# Patient Record
Sex: MALE | Race: WHITE | NOT HISPANIC OR LATINO | Employment: FULL TIME | URBAN - METROPOLITAN AREA
[De-identification: names, ages, dates, MRNs, and addresses within clinical notes are randomized per-mention and may not be internally consistent; named-entity substitution may affect disease eponyms.]

---

## 2017-07-14 ENCOUNTER — HOSPITAL ENCOUNTER (OUTPATIENT)
Dept: LAB | Facility: MEDICAL CENTER | Age: 32
End: 2017-07-14
Attending: INTERNAL MEDICINE
Payer: COMMERCIAL

## 2017-07-14 LAB
25(OH)D3 SERPL-MCNC: 32 NG/ML (ref 30–100)
ALBUMIN SERPL BCP-MCNC: 4.7 G/DL (ref 3.2–4.9)
ALP SERPL-CCNC: 42 U/L (ref 30–99)
ALT SERPL-CCNC: 15 U/L (ref 2–50)
AMYLASE SERPL-CCNC: 39 U/L (ref 20–103)
AST SERPL-CCNC: 16 U/L (ref 12–45)
BASOPHILS # BLD AUTO: 0.7 % (ref 0–1.8)
BASOPHILS # BLD: 0.04 K/UL (ref 0–0.12)
BILIRUB CONJ SERPL-MCNC: 0.2 MG/DL (ref 0.1–0.5)
BILIRUB INDIRECT SERPL-MCNC: 1.3 MG/DL (ref 0–1)
BILIRUB SERPL-MCNC: 1.5 MG/DL (ref 0.1–1.5)
EOSINOPHIL # BLD AUTO: 0.1 K/UL (ref 0–0.51)
EOSINOPHIL NFR BLD: 1.8 % (ref 0–6.9)
ERYTHROCYTE [DISTWIDTH] IN BLOOD BY AUTOMATED COUNT: 36.3 FL (ref 35.9–50)
FOLATE SERPL-MCNC: 17.7 NG/ML
HCT VFR BLD AUTO: 43.4 % (ref 42–52)
HGB BLD-MCNC: 15.5 G/DL (ref 14–18)
IMM GRANULOCYTES # BLD AUTO: 0.01 K/UL (ref 0–0.11)
IMM GRANULOCYTES NFR BLD AUTO: 0.2 % (ref 0–0.9)
LIPASE SERPL-CCNC: 11 U/L (ref 11–82)
LYMPHOCYTES # BLD AUTO: 1.81 K/UL (ref 1–4.8)
LYMPHOCYTES NFR BLD: 33.5 % (ref 22–41)
MAGNESIUM SERPL-MCNC: 2 MG/DL (ref 1.5–2.5)
MCH RBC QN AUTO: 32.1 PG (ref 27–33)
MCHC RBC AUTO-ENTMCNC: 35.7 G/DL (ref 33.7–35.3)
MCV RBC AUTO: 89.9 FL (ref 81.4–97.8)
MONOCYTES # BLD AUTO: 0.43 K/UL (ref 0–0.85)
MONOCYTES NFR BLD AUTO: 7.9 % (ref 0–13.4)
NEUTROPHILS # BLD AUTO: 3.02 K/UL (ref 1.82–7.42)
NEUTROPHILS NFR BLD: 55.9 % (ref 44–72)
NRBC # BLD AUTO: 0 K/UL
NRBC BLD AUTO-RTO: 0 /100 WBC
PLATELET # BLD AUTO: 206 K/UL (ref 164–446)
PMV BLD AUTO: 10.9 FL (ref 9–12.9)
PROT SERPL-MCNC: 6.5 G/DL (ref 6–8.2)
RBC # BLD AUTO: 4.83 M/UL (ref 4.7–6.1)
VIT B12 SERPL-MCNC: 156 PG/ML (ref 211–911)
WBC # BLD AUTO: 5.4 K/UL (ref 4.8–10.8)

## 2017-07-14 PROCEDURE — 82746 ASSAY OF FOLIC ACID SERUM: CPT

## 2017-07-14 PROCEDURE — 83690 ASSAY OF LIPASE: CPT

## 2017-07-14 PROCEDURE — 36415 COLL VENOUS BLD VENIPUNCTURE: CPT

## 2017-07-14 PROCEDURE — 82306 VITAMIN D 25 HYDROXY: CPT

## 2017-07-14 PROCEDURE — 83735 ASSAY OF MAGNESIUM: CPT

## 2017-07-14 PROCEDURE — 80076 HEPATIC FUNCTION PANEL: CPT

## 2017-07-14 PROCEDURE — 82607 VITAMIN B-12: CPT

## 2017-07-14 PROCEDURE — 82150 ASSAY OF AMYLASE: CPT

## 2017-07-14 PROCEDURE — 85025 COMPLETE CBC W/AUTO DIFF WBC: CPT

## 2018-10-22 ENCOUNTER — OFFICE VISIT (OUTPATIENT)
Dept: MEDICAL GROUP | Facility: MEDICAL CENTER | Age: 33
End: 2018-10-22
Payer: COMMERCIAL

## 2018-10-22 VITALS
WEIGHT: 174 LBS | HEIGHT: 67 IN | SYSTOLIC BLOOD PRESSURE: 124 MMHG | BODY MASS INDEX: 27.31 KG/M2 | OXYGEN SATURATION: 98 % | DIASTOLIC BLOOD PRESSURE: 62 MMHG | HEART RATE: 83 BPM | TEMPERATURE: 97.1 F

## 2018-10-22 DIAGNOSIS — F90.0 ATTENTION DEFICIT HYPERACTIVITY DISORDER (ADHD), PREDOMINANTLY INATTENTIVE TYPE: ICD-10-CM

## 2018-10-22 DIAGNOSIS — Z76.89 ENCOUNTER TO ESTABLISH CARE: ICD-10-CM

## 2018-10-22 DIAGNOSIS — K21.9 GASTROESOPHAGEAL REFLUX DISEASE, ESOPHAGITIS PRESENCE NOT SPECIFIED: ICD-10-CM

## 2018-10-22 DIAGNOSIS — E53.8 LOW SERUM VITAMIN B12: ICD-10-CM

## 2018-10-22 PROCEDURE — 99204 OFFICE O/P NEW MOD 45 MIN: CPT | Performed by: PHYSICIAN ASSISTANT

## 2018-10-22 RX ORDER — OMEPRAZOLE 20 MG/1
20 CAPSULE, DELAYED RELEASE ORAL DAILY
Qty: 90 CAP | Refills: 3 | Status: SHIPPED | OUTPATIENT
Start: 2018-10-22 | End: 2019-10-30 | Stop reason: SDUPTHER

## 2018-10-22 NOTE — ASSESSMENT & PLAN NOTE
This is a 33-year-old male who is here today to establish care. He has a chronic history of reflux symptoms. Has had symptoms for at least 10 years. He was scoped about 4 months ago by Dr. Merchant. Was told he needed a follow-up endoscopy in about 4 years. He was not diagnosed with Lyles's esophagus. Takes omeprazole 20 mg daily with good results. No exacerbation of symptoms as long as he is taking medication. Denies any current dysphagia.

## 2018-10-22 NOTE — PROGRESS NOTES
Subjective:   Greg Mistry is a 33 y.o. male here today for GERD, ADHD and to establish care.    GERD (gastroesophageal reflux disease)  This is a 33-year-old male who is here today to establish care. He has a chronic history of reflux symptoms. Has had symptoms for at least 10 years. He was scoped about 4 months ago by Dr. Merchant. Was told he needed a follow-up endoscopy in about 4 years. He was not diagnosed with Lyles's esophagus. Takes omeprazole 20 mg daily with good results. No exacerbation of symptoms as long as he is taking medication. Denies any current dysphagia.    ADHD (attention deficit hyperactivity disorder)  Years ago he was on Ritalin for ADHD. Primarily and attentiveness. 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 States that the ability to keep focus has been difficult. He denies any depression or anxiety. He is  with one child and his wife is pregnant. Would like to get back on some kind of ADHD medication. In the past he was on Ritalin twice a day.    Low serum vitamin B12  Because of his chronic history of reflux his vitamin B12 level has been low. Takes of B12 supplement. Recent labs showed good control as vitamin B12.       Current medicines (including changes today)  Current Outpatient Prescriptions   Medication Sig Dispense Refill   • omeprazole (PRILOSEC) 20 MG delayed-release capsule Take 1 Cap by mouth every day. 1/2 hour prior to same meal. 90 Cap 3   • methylphenidate (RITALIN) 10 MG TABS Take 10 mg by mouth 2 times a day.       No current facility-administered medications for this visit.      He  has a past medical history of GERD (gastroesophageal reflux disease).    Social History and Family History were reviewed and updated.    ROS   No chest pain, no shortness of breath, no abdominal pain and all other systems were reviewed and are negative.       Objective:     Blood pressure  "124/62, pulse 83, temperature 36.2 °C (97.1 °F), height 1.702 m (5' 7\"), weight 78.9 kg (174 lb), SpO2 98 %. Body mass index is 27.25 kg/m².   Physical Exam:  Constitutional: Alert, no distress.  Skin: Warm, dry, good turgor, no rashes in visible areas.  Eye: Equal, round and reactive, conjunctiva clear, lids normal.  ENMT: Lips without lesions, good dentition, oropharynx clear.  Neck: Trachea midline, no masses.   Lymph: No cervical or supraclavicular lymphadenopathy  Respiratory: Unlabored respiratory effort, lungs clear to auscultation, no wheezes, no ronchi.  Cardiovascular: Normal S1, S2, no murmur, no edema.  Abdomen: Soft, non-tender, no masses.  Psych: Alert and oriented x3, normal affect and mood.        Assessment and Plan:   The following treatment plan was discussed    1. Gastroesophageal reflux disease, esophagitis presence not specified  Chronic condition. Controlled. Provided prescription for Prilosec 20 mg take as directed. Follow with GI if needed.  - omeprazole (PRILOSEC) 20 MG delayed-release capsule; Take 1 Cap by mouth every day. 1/2 hour prior to same meal.  Dispense: 90 Cap; Refill: 3    2. Attention deficit hyperactivity disorder (ADHD), predominantly inattentive type  Chronic condition. Recent exacerbation given a new position at his work. Refer to psychiatry to establish care. In the future may return for refill of medication. Would like to first see him be established by psychiatry and have the proper diagnosis.  - REFERRAL TO PSYCHIATRY    3. Low serum vitamin B12  Chronic condition status post GERD. Stable. Continue vitamin B12 supplement over-the-counter.    4. Encounter to establish care      Followup: Return in about 1 year (around 10/22/2019), or if symptoms worsen or fail to improve.    Please note that this dictation was created using voice recognition software. I have made every reasonable attempt to correct obvious errors, but I expect that there are errors of grammar and " possibly content that I did not discover before finalizing the note.

## 2018-10-22 NOTE — ASSESSMENT & PLAN NOTE
Years ago he was on Ritalin for ADHD. Primarily and attentiveness. 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 States that the ability to keep focus has been difficult. He denies any depression or anxiety. He is  with one child and his wife is pregnant. Would like to get back on some kind of ADHD medication. In the past he was on Ritalin twice a day.

## 2018-10-22 NOTE — ASSESSMENT & PLAN NOTE
Because of his chronic history of reflux his vitamin B12 level has been low. Takes of B12 supplement. Recent labs showed good control as vitamin B12.

## 2019-01-11 ENCOUNTER — OFFICE VISIT (OUTPATIENT)
Dept: URGENT CARE | Facility: CLINIC | Age: 34
End: 2019-01-11
Payer: COMMERCIAL

## 2019-01-11 VITALS
RESPIRATION RATE: 16 BRPM | HEART RATE: 87 BPM | SYSTOLIC BLOOD PRESSURE: 112 MMHG | WEIGHT: 170 LBS | BODY MASS INDEX: 26.68 KG/M2 | DIASTOLIC BLOOD PRESSURE: 64 MMHG | TEMPERATURE: 97.2 F | HEIGHT: 67 IN | OXYGEN SATURATION: 97 %

## 2019-01-11 DIAGNOSIS — H65.02 ACUTE SEROUS OTITIS MEDIA OF LEFT EAR, RECURRENCE NOT SPECIFIED: ICD-10-CM

## 2019-01-11 PROCEDURE — 99214 OFFICE O/P EST MOD 30 MIN: CPT | Performed by: PHYSICIAN ASSISTANT

## 2019-01-11 RX ORDER — AMOXICILLIN 875 MG/1
875 TABLET, COATED ORAL 2 TIMES DAILY
Qty: 20 TAB | Refills: 0 | Status: SHIPPED | OUTPATIENT
Start: 2019-01-11 | End: 2019-01-21

## 2019-01-11 ASSESSMENT — ENCOUNTER SYMPTOMS
COUGH: 0
CHILLS: 0
MYALGIAS: 0
NECK PAIN: 0
PALPITATIONS: 0
WHEEZING: 0
SPUTUM PRODUCTION: 0
SORE THROAT: 0
SHORTNESS OF BREATH: 0
FEVER: 0
RHINORRHEA: 0
NAUSEA: 0
VOMITING: 0
DIARRHEA: 0
HEADACHES: 0

## 2019-01-11 NOTE — PROGRESS NOTES
"Subjective:      Greg Mistry is a 33 y.o. male who presents with Otalgia (x2 days, left ear pain, feels as though there could be water/clogged and jaw pain and pressure )            Otalgia    There is pain in the left ear. This is a new problem. Episode onset: 1-2 days  The problem has been gradually worsening. There has been no fever. Associated symptoms include ear discharge (clear) and hearing loss (muffled hearing left ear ). Pertinent negatives include no coughing, diarrhea, headaches, neck pain, rash, rhinorrhea, sore throat or vomiting. Associated symptoms comments: Left jaw hurts, ear congestion. He has tried NSAIDs for the symptoms. The treatment provided mild relief.       Past Medical History:   Diagnosis Date   • GERD (gastroesophageal reflux disease)        Past Surgical History:   Procedure Laterality Date   • OTHER ABDOMINAL SURGERY      appy       History reviewed. No pertinent family history.    Allergies   Allergen Reactions   • Nkda [No Known Drug Allergy]        Medications, Allergies, and current problem list reviewed today in Epic      Review of Systems   Constitutional: Negative for chills, fever and malaise/fatigue.   HENT: Positive for ear discharge (clear), ear pain and hearing loss (muffled hearing left ear ). Negative for congestion, rhinorrhea and sore throat.    Respiratory: Negative for cough, sputum production, shortness of breath and wheezing.    Cardiovascular: Negative for chest pain, palpitations and leg swelling.   Gastrointestinal: Negative for diarrhea, nausea and vomiting.   Musculoskeletal: Negative for myalgias and neck pain.   Skin: Negative for rash.   Neurological: Negative for headaches.       All other systems reviewed and are negative.        Objective:     /64   Pulse 87   Temp 36.2 °C (97.2 °F)   Resp 16   Ht 1.702 m (5' 7\")   Wt 77.1 kg (170 lb)   SpO2 97%   BMI 26.63 kg/m²      Physical Exam   Constitutional: He is oriented to person, place, and " time. He appears well-developed and well-nourished. No distress.   HENT:   Head: Normocephalic and atraumatic.   Right Ear: Tympanic membrane, external ear and ear canal normal.   Left Ear: External ear and ear canal normal. No drainage or swelling. Tympanic membrane is injected. Tympanic membrane is not perforated. A middle ear effusion is present.   Nose: Nose normal.   Mouth/Throat: Uvula is midline, oropharynx is clear and moist and mucous membranes are normal.   Eyes: Conjunctivae are normal.   Neck: Neck supple.   Cardiovascular: Normal rate, regular rhythm and normal heart sounds.  Exam reveals no gallop and no friction rub.    No murmur heard.  Pulmonary/Chest: Effort normal. No respiratory distress. He has no wheezes. He has no rales.   Lymphadenopathy:     He has no cervical adenopathy.   Neurological: He is alert and oriented to person, place, and time. No cranial nerve deficit.   Skin: Skin is warm and dry. No rash noted.   Psychiatric: He has a normal mood and affect. His behavior is normal. Judgment and thought content normal.               Assessment/Plan:     1. Acute serous otitis media of left ear, recurrence not specified  amoxicillin (AMOXIL) 875 MG tablet         Current Outpatient Prescriptions:   •  amoxicillin (AMOXIL) 875 MG tablet, Take 1 Tab by mouth 2 times a day for 10 days., Disp: 20 Tab, Rfl: 0       Differential diagnoses, Supportive care, and indications for immediate follow-up discussed with patient.   Instructed to return to clinic or nearest emergency department for any change in condition, further concerns, or worsening of symptoms.    The patient demonstrated a good understanding and agreed with the treatment plan.      Gayathri Nava P.A.-C.

## 2019-01-28 ENCOUNTER — OFFICE VISIT (OUTPATIENT)
Dept: URGENT CARE | Facility: MEDICAL CENTER | Age: 34
End: 2019-01-28
Payer: COMMERCIAL

## 2019-01-28 VITALS
WEIGHT: 173 LBS | HEART RATE: 107 BPM | SYSTOLIC BLOOD PRESSURE: 106 MMHG | RESPIRATION RATE: 16 BRPM | TEMPERATURE: 98.4 F | DIASTOLIC BLOOD PRESSURE: 64 MMHG | BODY MASS INDEX: 27.1 KG/M2 | OXYGEN SATURATION: 97 %

## 2019-01-28 DIAGNOSIS — J02.9 SORE THROAT: ICD-10-CM

## 2019-01-28 DIAGNOSIS — R05.9 COUGH: ICD-10-CM

## 2019-01-28 DIAGNOSIS — J06.9 VIRAL UPPER RESPIRATORY TRACT INFECTION: Primary | ICD-10-CM

## 2019-01-28 LAB
FLUAV+FLUBV AG SPEC QL IA: NORMAL
INT CON NEG: NEGATIVE
INT CON NEG: NEGATIVE
INT CON POS: POSITIVE
INT CON POS: POSITIVE
S PYO AG THROAT QL: NEGATIVE

## 2019-01-28 PROCEDURE — 87804 INFLUENZA ASSAY W/OPTIC: CPT | Performed by: NURSE PRACTITIONER

## 2019-01-28 PROCEDURE — 87880 STREP A ASSAY W/OPTIC: CPT | Performed by: NURSE PRACTITIONER

## 2019-01-28 PROCEDURE — 99213 OFFICE O/P EST LOW 20 MIN: CPT | Performed by: NURSE PRACTITIONER

## 2019-01-28 ASSESSMENT — ENCOUNTER SYMPTOMS
DIZZINESS: 0
EYES NEGATIVE: 1
SHORTNESS OF BREATH: 0
SENSORY CHANGE: 0
FOCAL WEAKNESS: 0
SORE THROAT: 1
CARDIOVASCULAR NEGATIVE: 1
TINGLING: 0
GASTROINTESTINAL NEGATIVE: 1
CHILLS: 1
HEADACHES: 1
WEAKNESS: 0
PSYCHIATRIC NEGATIVE: 1
COUGH: 1
MUSCULOSKELETAL NEGATIVE: 1

## 2019-01-29 NOTE — PROGRESS NOTES
Subjective:     Greg Mistry is a 33 y.o. male who presents for Pharyngitis (cough, fever, achy, 2 days)       Pharyngitis    This is a new problem. The current episode started yesterday. The problem has been gradually worsening. Maximum temperature: unmeasured. The pain is moderate. Associated symptoms include congestion, coughing and headaches. Pertinent negatives include no ear pain or shortness of breath. Associated symptoms comments: +chills. He has tried NSAIDs for the symptoms.   Pt has had his flu shot this season. Pt states he has young children at home and wants to make sure he doesn't have strep or the flu.    PMH:  has a past medical history of GERD (gastroesophageal reflux disease).    MEDS:   Current Outpatient Prescriptions:   •  omeprazole (PRILOSEC) 20 MG delayed-release capsule, Take 1 Cap by mouth every day. 1/2 hour prior to same meal., Disp: 90 Cap, Rfl: 3  •  methylphenidate (RITALIN) 10 MG TABS, Take 10 mg by mouth 2 times a day., Disp: , Rfl:     ALLERGIES:   Allergies   Allergen Reactions   • Nkda [No Known Drug Allergy]      SURGHX:   Past Surgical History:   Procedure Laterality Date   • OTHER ABDOMINAL SURGERY      appy     SOCHX:  reports that he has never smoked. He has never used smokeless tobacco. He reports that he drinks about 4.2 oz of alcohol per week . He reports that he does not use drugs.     FH: Reviewed with patient, not pertinent to this visit.     Review of Systems   Constitutional: Positive for chills. Negative for malaise/fatigue.   HENT: Positive for congestion and sore throat. Negative for ear pain.    Eyes: Negative.    Respiratory: Positive for cough. Negative for shortness of breath.    Cardiovascular: Negative.  Negative for chest pain.   Gastrointestinal: Negative.    Genitourinary: Negative.    Musculoskeletal: Negative.    Skin: Negative.    Neurological: Positive for headaches. Negative for dizziness, tingling, sensory change, focal weakness and weakness.    Psychiatric/Behavioral: Negative.    All other systems reviewed and are negative.    Objective:     /64   Pulse (!) 107   Temp 36.9 °C (98.4 °F)   Resp 16   Wt 78.5 kg (173 lb)   SpO2 97%   BMI 27.10 kg/m²     Physical Exam   Constitutional: He is oriented to person, place, and time. He appears well-developed and well-nourished. He is cooperative. No distress.   HENT:   Head: Normocephalic.   Right Ear: Tympanic membrane normal.   Left Ear: Tympanic membrane normal.   Nose: Rhinorrhea present.   Mouth/Throat: Uvula is midline and mucous membranes are normal. Posterior oropharyngeal edema and posterior oropharyngeal erythema present.   Eyes: Pupils are equal, round, and reactive to light. Conjunctivae and EOM are normal.   Neck: Normal range of motion.   Cardiovascular: Regular rhythm, normal heart sounds and normal pulses.    Pulmonary/Chest: Effort normal and breath sounds normal. No respiratory distress.   Abdominal: Soft. Bowel sounds are normal.   Musculoskeletal: Normal range of motion. He exhibits no deformity.   Lymphadenopathy:     He has no cervical adenopathy.   Neurological: He is alert and oriented to person, place, and time. He has normal strength. No sensory deficit.   Skin: Skin is warm and dry. Capillary refill takes less than 2 seconds.   Psychiatric: He has a normal mood and affect.   Vitals reviewed.    Rapid Strep A swab: negative    Influenza A/B swab: negative       Assessment/Plan:     1. Viral upper respiratory tract infection    2. Sore throat  - POCT Rapid Strep A    3. Cough  - POCT Influenza A/B    Discussed likely viral etiology and expected course and duration of illness. Discussed supportive measures including increasing fluids and rest as well as OTC symptom management including acetaminophen and/or ibuprofen PRN pain and/or fever.    Patient advised to: Return for 1) Symptoms don't improve or worsen, or go to ER, 2) Follow up with primary care in 7-10  days.    Differential diagnosis, natural history, supportive care, and indications for immediate follow-up discussed. All questions answered. Patient agrees with the plan of care.

## 2019-01-29 NOTE — PATIENT INSTRUCTIONS
"Upper Respiratory Infection, Adult  Most upper respiratory infections (URIs) are a viral infection of the air passages leading to the lungs. A URI affects the nose, throat, and upper air passages. The most common type of URI is nasopharyngitis and is typically referred to as \"the common cold.\"  URIs run their course and usually go away on their own. Most of the time, a URI does not require medical attention, but sometimes a bacterial infection in the upper airways can follow a viral infection. This is called a secondary infection. Sinus and middle ear infections are common types of secondary upper respiratory infections.  Bacterial pneumonia can also complicate a URI. A URI can worsen asthma and chronic obstructive pulmonary disease (COPD). Sometimes, these complications can require emergency medical care and may be life threatening.  What are the causes?  Almost all URIs are caused by viruses. A virus is a type of germ and can spread from one person to another.  What increases the risk?  You may be at risk for a URI if:  · You smoke.  · You have chronic heart or lung disease.  · You have a weakened defense (immune) system.  · You are very young or very old.  · You have nasal allergies or asthma.  · You work in crowded or poorly ventilated areas.  · You work in health care facilities or schools.  What are the signs or symptoms?  Symptoms typically develop 2-3 days after you come in contact with a cold virus. Most viral URIs last 7-10 days. However, viral URIs from the influenza virus (flu virus) can last 14-18 days and are typically more severe. Symptoms may include:  · Runny or stuffy (congested) nose.  · Sneezing.  · Cough.  · Sore throat.  · Headache.  · Fatigue.  · Fever.  · Loss of appetite.  · Pain in your forehead, behind your eyes, and over your cheekbones (sinus pain).  · Muscle aches.  How is this diagnosed?  Your health care provider may diagnose a URI by:  · Physical exam.  · Tests to check that your " symptoms are not due to another condition such as:  ¨ Strep throat.  ¨ Sinusitis.  ¨ Pneumonia.  ¨ Asthma.  How is this treated?  A URI goes away on its own with time. It cannot be cured with medicines, but medicines may be prescribed or recommended to relieve symptoms. Medicines may help:  · Reduce your fever.  · Reduce your cough.  · Relieve nasal congestion.  Follow these instructions at home:  · Take medicines only as directed by your health care provider.  · Gargle warm saltwater or take cough drops to comfort your throat as directed by your health care provider.  · Use a warm mist humidifier or inhale steam from a shower to increase air moisture. This may make it easier to breathe.  · Drink enough fluid to keep your urine clear or pale yellow.  · Eat soups and other clear broths and maintain good nutrition.  · Rest as needed.  · Return to work when your temperature has returned to normal or as your health care provider advises. You may need to stay home longer to avoid infecting others. You can also use a face mask and careful hand washing to prevent spread of the virus.  · Increase the usage of your inhaler if you have asthma.  · Do not use any tobacco products, including cigarettes, chewing tobacco, or electronic cigarettes. If you need help quitting, ask your health care provider.  How is this prevented?  The best way to protect yourself from getting a cold is to practice good hygiene.  · Avoid oral or hand contact with people with cold symptoms.  · Wash your hands often if contact occurs.  There is no clear evidence that vitamin C, vitamin E, echinacea, or exercise reduces the chance of developing a cold. However, it is always recommended to get plenty of rest, exercise, and practice good nutrition.  Contact a health care provider if:  · You are getting worse rather than better.  · Your symptoms are not controlled by medicine.  · You have chills.  · You have worsening shortness of breath.  · You have brown  or red mucus.  · You have yellow or brown nasal discharge.  · You have pain in your face, especially when you bend forward.  · You have a fever.  · You have swollen neck glands.  · You have pain while swallowing.  · You have white areas in the back of your throat.  Get help right away if:  · You have severe or persistent:  ¨ Headache.  ¨ Ear pain.  ¨ Sinus pain.  ¨ Chest pain.  · You have chronic lung disease and any of the following:  ¨ Wheezing.  ¨ Prolonged cough.  ¨ Coughing up blood.  ¨ A change in your usual mucus.  · You have a stiff neck.  · You have changes in your:  ¨ Vision.  ¨ Hearing.  ¨ Thinking.  ¨ Mood.  This information is not intended to replace advice given to you by your health care provider. Make sure you discuss any questions you have with your health care provider.  Document Released: 06/13/2002 Document Revised: 08/20/2017 Document Reviewed: 03/25/2015  [employer] Interactive Patient Education © 2017 [employer]

## 2019-03-26 ENCOUNTER — OFFICE VISIT (OUTPATIENT)
Dept: MEDICAL GROUP | Facility: MEDICAL CENTER | Age: 34
End: 2019-03-26
Payer: COMMERCIAL

## 2019-03-26 VITALS
HEIGHT: 67 IN | TEMPERATURE: 97 F | WEIGHT: 168.4 LBS | HEART RATE: 93 BPM | SYSTOLIC BLOOD PRESSURE: 104 MMHG | DIASTOLIC BLOOD PRESSURE: 64 MMHG | BODY MASS INDEX: 26.43 KG/M2 | RESPIRATION RATE: 16 BRPM | OXYGEN SATURATION: 100 %

## 2019-03-26 DIAGNOSIS — J06.9 ACUTE URI: ICD-10-CM

## 2019-03-26 DIAGNOSIS — F90.0 ATTENTION DEFICIT HYPERACTIVITY DISORDER (ADHD), PREDOMINANTLY INATTENTIVE TYPE: ICD-10-CM

## 2019-03-26 DIAGNOSIS — K21.9 GASTROESOPHAGEAL REFLUX DISEASE, ESOPHAGITIS PRESENCE NOT SPECIFIED: ICD-10-CM

## 2019-03-26 PROCEDURE — 99214 OFFICE O/P EST MOD 30 MIN: CPT | Performed by: PHYSICIAN ASSISTANT

## 2019-03-26 RX ORDER — PROMETHAZINE HYDROCHLORIDE AND CODEINE PHOSPHATE 6.25; 1 MG/5ML; MG/5ML
5 SYRUP ORAL NIGHTLY PRN
Qty: 160 ML | Refills: 0 | Status: SHIPPED | OUTPATIENT
Start: 2019-03-26 | End: 2019-04-25

## 2019-03-26 ASSESSMENT — PATIENT HEALTH QUESTIONNAIRE - PHQ9: CLINICAL INTERPRETATION OF PHQ2 SCORE: 0

## 2019-03-26 NOTE — ASSESSMENT & PLAN NOTE
This is a 33-year-old male coming today complaining of a 2-week history of coughing.  Associated sinus congestion and drainage.  Initially started as flulike symptoms.  He states that his 20-month-old girl is sick at home.  There is also a 4-month-old.  Both are in a  facility at [facility].  He has been taking over-the-counter cold medication such as Mucinex.  Denies any fever, shortness of breath or chest pain.  States that his wife wanted him to come in to be evaluated for his cough at nighttime.  Makes it difficult for her to sleep.

## 2019-03-26 NOTE — ASSESSMENT & PLAN NOTE
Chronic condition.  Stable.  Continues with omeprazole daily.  Unable to get a 30-day supply from his pharmacy.

## 2019-03-26 NOTE — ASSESSMENT & PLAN NOTE
He never followed up with psychiatry as it was a provider that I had persuaded again seen.  States that he will put the ADHD on hold.  He is overall doing well.  Would like Ritalin taken off of the medications list.

## 2019-03-26 NOTE — PROGRESS NOTES
"Subjective:   Greg Mistry is a 33 y.o. male here today for cough for 2 weeks, ADHD and GERD.    Acute URI  This is a 33-year-old male coming today complaining of a 2-week history of coughing.  Associated sinus congestion and drainage.  Initially started as flulike symptoms.  He states that his 20-month-old girl is sick at home.  There is also a 4-month-old.  Both are in a  facility at [facility].  He has been taking over-the-counter cold medication such as Mucinex.  Denies any fever, shortness of breath or chest pain.  States that his wife wanted him to come in to be evaluated for his cough at nighttime.  Makes it difficult for her to sleep.    ADHD (attention deficit hyperactivity disorder)  He never followed up with psychiatry as it was a provider that I had persuaded again seen.  States that he will put the ADHD on hold.  He is overall doing well.  Would like Ritalin taken off of the medications list.    GERD (gastroesophageal reflux disease)  Chronic condition.  Stable.  Continues with omeprazole daily.  Unable to get a 30-day supply from his pharmacy.       Current medicines (including changes today)  Current Outpatient Prescriptions   Medication Sig Dispense Refill   • promethazine-codeine (PHENERGAN-CODEINE) 6.25-10 MG/5ML Syrup Take 5 mL by mouth at bedtime as needed for up to 30 days. 160 mL 0   • omeprazole (PRILOSEC) 20 MG delayed-release capsule Take 1 Cap by mouth every day. 1/2 hour prior to same meal. 90 Cap 3     No current facility-administered medications for this visit.      He  has a past medical history of GERD (gastroesophageal reflux disease).    Social History and Family History were reviewed and updated.    ROS   No chest pain, no shortness of breath, no abdominal pain and all other systems were reviewed and are negative.       Objective:     Blood pressure 104/64, pulse 93, temperature 36.1 °C (97 °F), resp. rate 16, height 1.702 m (5' 7\"), weight 76.4 kg (168 lb 6.4 oz), SpO2 100 %. " Body mass index is 26.38 kg/m².   Physical Exam:  Constitutional: Alert, no distress.  Skin: Warm, dry, good turgor, no rashes in visible areas.  Eye: Equal, round and reactive, conjunctiva clear, lids normal.  ENMT: Lips without lesions, good dentition, oropharynx clear.  Neck: Trachea midline, no masses.   Lymph: No cervical or supraclavicular lymphadenopathy  Respiratory: Unlabored respiratory effort, lungs clear to auscultation, no wheezes, no ronchi.  Cardiovascular: Normal S1, S2, no murmur, no edema.  Psych: Alert and oriented x3, normal affect and mood.        Assessment and Plan:   The following treatment plan was discussed    1. Acute URI  Acute, new onset condition.  Discussed with viral process.  Push fluids.  Clear nose regularly.  May continue over-the-counter cold medications.  Provided a cough syrup with codeine.   reviewed.  Medication appropriate.  May take half hour prior to bedtime.  Discussed side effects.  Do not take with other cold medications.  Follow-up with any worsening symptoms such as fever, shortness of breath or chest pain.  Cough could last another few weeks.  Potentially start an over-the-counter antihistamine such as Claritin.  - promethazine-codeine (PHENERGAN-CODEINE) 6.25-10 MG/5ML Syrup; Take 5 mL by mouth at bedtime as needed for up to 30 days.  Dispense: 160 mL; Refill: 0    2. Attention deficit hyperactivity disorder (ADHD), predominantly inattentive type  Chronic condition.  Stable.  We will continue to monitor symptoms.    3. Gastroesophageal reflux disease, esophagitis presence not specified  Chronic condition.  Stable.  Continue omeprazole use daily.  Advised to contact insurance regarding mail order service.  May merit a 90-day supply      Followup: Return if symptoms worsen or fail to improve.    Please note that this dictation was created using voice recognition software. I have made every reasonable attempt to correct obvious errors, but I expect that there are  errors of grammar and possibly content that I did not discover before finalizing the note.

## 2019-10-30 DIAGNOSIS — K21.9 GASTROESOPHAGEAL REFLUX DISEASE, ESOPHAGITIS PRESENCE NOT SPECIFIED: ICD-10-CM

## 2019-10-30 RX ORDER — OMEPRAZOLE 20 MG/1
20 CAPSULE, DELAYED RELEASE ORAL DAILY
Qty: 30 CAP | Refills: 11 | Status: SHIPPED | OUTPATIENT
Start: 2019-10-30 | End: 2020-10-18

## 2019-12-31 ENCOUNTER — OFFICE VISIT (OUTPATIENT)
Dept: MEDICAL GROUP | Facility: MEDICAL CENTER | Age: 34
End: 2019-12-31
Payer: COMMERCIAL

## 2019-12-31 VITALS
WEIGHT: 167.55 LBS | OXYGEN SATURATION: 98 % | RESPIRATION RATE: 16 BRPM | BODY MASS INDEX: 26.3 KG/M2 | SYSTOLIC BLOOD PRESSURE: 122 MMHG | HEIGHT: 67 IN | DIASTOLIC BLOOD PRESSURE: 64 MMHG | HEART RATE: 84 BPM | TEMPERATURE: 97.7 F

## 2019-12-31 DIAGNOSIS — J02.9 ACUTE PHARYNGITIS, UNSPECIFIED ETIOLOGY: ICD-10-CM

## 2019-12-31 PROBLEM — J06.9 ACUTE URI: Status: RESOLVED | Noted: 2019-03-26 | Resolved: 2019-12-31

## 2019-12-31 PROCEDURE — 99214 OFFICE O/P EST MOD 30 MIN: CPT | Performed by: PHYSICIAN ASSISTANT

## 2019-12-31 RX ORDER — PREDNISONE 20 MG/1
TABLET ORAL
Qty: 18 TAB | Refills: 0 | Status: SHIPPED | OUTPATIENT
Start: 2019-12-31 | End: 2021-05-12

## 2019-12-31 RX ORDER — VALACYCLOVIR HYDROCHLORIDE 1 G/1
1000 TABLET, FILM COATED ORAL 2 TIMES DAILY
Qty: 40 TAB | Refills: 1 | Status: SHIPPED | OUTPATIENT
Start: 2019-12-31 | End: 2021-05-12

## 2019-12-31 NOTE — PROGRESS NOTES
"Subjective:   Greg Mistry is a 34 y.o. male here today for sore throat for 4 days.    Acute pharyngitis  This is a 34-year-old male who complains of a 4-day history of a sore throat.  Ibuprofen is not effective.  Believes he may have a cold.  Concerned about strep.  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  No lesions on his hands.  No concern of his feet.  Denies any fevers.  No chest pain or shortness of breath.  Does have some discomfort in the ears as well as lower throat and jaw.  Initial pain began on Saturday in the front of his mouth.             Current medicines (including changes today)  Current Outpatient Medications   Medication Sig Dispense Refill   • predniSONE (DELTASONE) 20 MG Tab Take 3 tablets day 1-3, then two tablets day 4-6 and then 1 tablet day 7-9. 18 Tab 0   • valacyclovir (VALTREX) 1 GM Tab Take 1 Tab by mouth 2 times a day. 40 Tab 1   • omeprazole (PRILOSEC) 20 MG delayed-release capsule TAKE 1 CAP BY MOUTH EVERY DAY. 1/2 HOUR PRIOR TO SAME MEAL. 30 Cap 11     No current facility-administered medications for this visit.      He  has a past medical history of GERD (gastroesophageal reflux disease).    Social History and Family History were reviewed and updated.    ROS     No chest pain, no shortness of breath, no abdominal pain and all other systems were reviewed and are negative.       Objective:     /64 (BP Location: Left arm, Patient Position: Sitting, BP Cuff Size: Adult)   Pulse 84   Temp 36.5 °C (97.7 °F) (Temporal)   Resp 16   Ht 1.702 m (5' 7\")   Wt 76 kg (167 lb 8.8 oz)   SpO2 98%  Body mass index is 26.24 kg/m².   Physical Exam:  Constitutional: Alert, no distress.  Skin: Warm, dry, good turgor, no rashes in visible areas.  Eye: Equal, round and reactive, conjunctiva clear, lids normal.  ENMT: Lips without lesions, good dentition, oropharynx with multiple ulcers noted.  Flat whitish lesions with surrounding erythema.  Couple " lesions on the sides of the tongue and one on the front of the mouth.  Neck: Trachea midline, no masses.   Lymph: No cervical or supraclavicular lymphadenopathy  Respiratory: Unlabored respiratory effort, lungs appear clear, no wheezes.  Cardiovascular: Normal S1, S2, no murmur, no edema.  Psych: Alert and oriented x3, normal affect and mood.        Assessment and Plan:   The following treatment plan was discussed    1. Acute pharyngitis, unspecified etiology  Acute, new onset condition.  Rapid stress was negative.  Differential includes herpes stomatitis, aphthous ulcers or another viral infection.  Provided a tapering course of prednisone.  Also provided Valtrex 1 g 2 tablets twice a day for up to 2 to 3 days.  Provided extra medication in case he has a reoccurrence.  Advised to contact me with any flares.  Also could be hand-foot-and-mouth disease but he has no lesions currently on his hands or his feet.  - predniSONE (DELTASONE) 20 MG Tab; Take 3 tablets day 1-3, then two tablets day 4-6 and then 1 tablet day 7-9.  Dispense: 18 Tab; Refill: 0  - valacyclovir (VALTREX) 1 GM Tab; Take 1 Tab by mouth 2 times a day.  Dispense: 40 Tab; Refill: 1      Followup: Return if symptoms worsen or fail to improve.    Please note that this dictation was created using voice recognition software. I have made every reasonable attempt to correct obvious errors, but I expect that there are errors of grammar and possibly content that I did not discover before finalizing the note.

## 2019-12-31 NOTE — ASSESSMENT & PLAN NOTE
This is a 34-year-old male who complains of a 4-day history of a sore throat.  Ibuprofen is not effective.  Believes he may have a cold.  Concerned about strep.  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  No lesions on his hands.  No concern of his feet.  Denies any fevers.  No chest pain or shortness of breath.  Does have some discomfort in the ears as well as lower throat and jaw.  Initial pain began on Saturday in the front of his mouth.

## 2020-11-10 ENCOUNTER — HOSPITAL ENCOUNTER (OUTPATIENT)
Dept: LAB | Facility: MEDICAL CENTER | Age: 35
End: 2020-11-10
Attending: EMERGENCY MEDICINE
Payer: COMMERCIAL

## 2020-11-10 PROCEDURE — C9803 HOPD COVID-19 SPEC COLLECT: HCPCS

## 2020-11-10 PROCEDURE — U0003 INFECTIOUS AGENT DETECTION BY NUCLEIC ACID (DNA OR RNA); SEVERE ACUTE RESPIRATORY SYNDROME CORONAVIRUS 2 (SARS-COV-2) (CORONAVIRUS DISEASE [COVID-19]), AMPLIFIED PROBE TECHNIQUE, MAKING USE OF HIGH THROUGHPUT TECHNOLOGIES AS DESCRIBED BY CMS-2020-01-R: HCPCS

## 2020-11-11 LAB
COVID ORDER STATUS COVID19: NORMAL
SARS-COV-2 RNA RESP QL NAA+PROBE: NOTDETECTED
SPECIMEN SOURCE: NORMAL

## 2020-11-24 ENCOUNTER — PATIENT MESSAGE (OUTPATIENT)
Dept: MEDICAL GROUP | Facility: MEDICAL CENTER | Age: 35
End: 2020-11-24

## 2020-11-25 ENCOUNTER — HOSPITAL ENCOUNTER (OUTPATIENT)
Dept: LAB | Facility: MEDICAL CENTER | Age: 35
End: 2020-11-25
Attending: EMERGENCY MEDICINE
Payer: COMMERCIAL

## 2020-11-25 PROCEDURE — U0003 INFECTIOUS AGENT DETECTION BY NUCLEIC ACID (DNA OR RNA); SEVERE ACUTE RESPIRATORY SYNDROME CORONAVIRUS 2 (SARS-COV-2) (CORONAVIRUS DISEASE [COVID-19]), AMPLIFIED PROBE TECHNIQUE, MAKING USE OF HIGH THROUGHPUT TECHNOLOGIES AS DESCRIBED BY CMS-2020-01-R: HCPCS

## 2020-11-25 PROCEDURE — C9803 HOPD COVID-19 SPEC COLLECT: HCPCS

## 2020-11-25 NOTE — TELEPHONE ENCOUNTER
Can someone call to follow up on whether this needs to be ordered before the drive through closes?

## 2020-11-26 LAB
COVID ORDER STATUS COVID19: NORMAL
SARS-COV-2 RNA RESP QL NAA+PROBE: DETECTED
SPECIMEN SOURCE: ABNORMAL

## 2020-11-28 PROBLEM — U07.1 COVID-19 VIRUS DETECTED: Status: ACTIVE | Noted: 2020-11-28

## 2020-11-28 PROBLEM — J02.9 ACUTE PHARYNGITIS: Status: RESOLVED | Noted: 2019-12-31 | Resolved: 2020-11-28

## 2021-01-21 DIAGNOSIS — K21.9 GASTROESOPHAGEAL REFLUX DISEASE: ICD-10-CM

## 2021-01-21 RX ORDER — OMEPRAZOLE 20 MG/1
CAPSULE, DELAYED RELEASE ORAL
Qty: 90 CAP | Refills: 0 | Status: SHIPPED | OUTPATIENT
Start: 2021-01-21 | End: 2021-05-12 | Stop reason: SDUPTHER

## 2021-05-12 ENCOUNTER — TELEMEDICINE (OUTPATIENT)
Dept: TELEHEALTH | Facility: TELEMEDICINE | Age: 36
End: 2021-05-12
Payer: COMMERCIAL

## 2021-05-12 DIAGNOSIS — K21.9 GASTROESOPHAGEAL REFLUX DISEASE: ICD-10-CM

## 2021-05-12 PROCEDURE — 99213 OFFICE O/P EST LOW 20 MIN: CPT | Mod: 95,CR | Performed by: NURSE PRACTITIONER

## 2021-05-12 RX ORDER — OMEPRAZOLE 20 MG/1
CAPSULE, DELAYED RELEASE ORAL
Qty: 90 CAPSULE | Refills: 0 | Status: SHIPPED | OUTPATIENT
Start: 2021-05-12 | End: 2022-09-09 | Stop reason: SDUPTHER

## 2021-05-12 ASSESSMENT — ENCOUNTER SYMPTOMS
CHILLS: 0
VOMITING: 0
NAUSEA: 0
DIARRHEA: 0
BLOOD IN STOOL: 0
ABDOMINAL PAIN: 0
PALPITATIONS: 0
COUGH: 0
HEARTBURN: 1
FEVER: 0
WEIGHT LOSS: 0
CONSTIPATION: 0

## 2021-05-12 NOTE — PROGRESS NOTES
Telemedicine: Established Patient   This evaluation was conducted via [employer] using secure and encrypted videoconferencing technology. The patient was in a private location in the state of [place].    The patient's identity was confirmed and verbal consent was obtained for this virtual visit.    Subjective:   CC: Gerd    Greg Mistry is a 35 y.o. male presenting for evaluation and management of:    Patient is a 25 year old male with c/o GERD requesting med refill today.  Has been seen by GI Dr. Merchant in the past and had endoscopy. He has been taking omeprazole for years. No side effects.  Unable to get an appt with PCP for refill.   Patient denies fevers, chills, nausea, emesis, hematemesis, hemoptysis, epigastric abdominal pain, loose stools, constipation, hematochezia, melena.      Review of Systems   Constitutional: Negative for chills, fever, malaise/fatigue and weight loss.   Respiratory: Negative for cough.    Cardiovascular: Negative for chest pain and palpitations.   Gastrointestinal: Positive for heartburn. Negative for abdominal pain, blood in stool, constipation, diarrhea, melena, nausea and vomiting.         Allergies   Allergen Reactions   • Nkda [No Known Drug Allergy]        Current medicines (including changes today)  Current Outpatient Medications   Medication Sig Dispense Refill   • omeprazole (PRILOSEC) 20 MG delayed-release capsule TAKE 1 CAP BY MOUTH EVERY DAY. HALF AN HOUR PRIOR TO SAME MEAL. 90 capsule 0     No current facility-administered medications for this visit.       Patient Active Problem List    Diagnosis Date Noted   • COVID-19 virus detected 11/28/2020   • GERD (gastroesophageal reflux disease) 10/22/2018   • Low serum vitamin B12 10/22/2018   • ADHD (attention deficit hyperactivity disorder) 08/08/2012       History reviewed. No pertinent family history.    He  has a past medical history of GERD (gastroesophageal reflux disease).  He  has a past surgical history that includes  other abdominal surgery.       Objective:     Vitals obtained by patient:  Temp: Afebrile per pt  Wt: 167 lbs    Physical Exam:  Constitutional: Alert, no distress, well-groomed.  Skin: No rashes in visible areas.  Eye: Round. Conjunctiva clear, lids normal. No icterus.   ENMT: Lips pink without lesions, good dentition, moist mucous membranes. Phonation normal.  Neck: No masses, no thyromegaly. Moves freely without pain.  CV: Pulse as reported by patient  Respiratory: Unlabored respiratory effort, no cough or audible wheeze  Psych: Alert and oriented x3, normal affect and mood.       Assessment and Plan:   The following treatment plan was discussed:     1. Gastroesophageal reflux disease  - omeprazole (PRILOSEC) 20 MG delayed-release capsule; TAKE 1 CAP BY MOUTH EVERY DAY. HALF AN HOUR PRIOR TO SAME MEAL.  Dispense: 90 capsule; Refill: 0  No red flags. F/U pcp for further refills   Supportive care, differential diagnoses, and indications for immediate follow-up discussed with patient.    Pathogenesis of diagnosis discussed including typical length and natural progression. Patient expresses understanding and agrees to plan.     Instructed patient to follow up with PCP or return to clinic for worsening symptoms or symptoms that persist for 7 to 10 days     Please note that this dictation was created using voice recognition software. I have made every reasonable attempt to correct obvious errors, but I expect that there are errors of grammar and possibly content that I did not discover before finalizing the note.         Follow-up: No follow-ups on file.

## 2022-01-02 DIAGNOSIS — Z30.09 VASECTOMY EVALUATION: ICD-10-CM

## 2022-09-09 ENCOUNTER — TELEMEDICINE (OUTPATIENT)
Dept: MEDICAL GROUP | Facility: MEDICAL CENTER | Age: 37
End: 2022-09-09
Payer: COMMERCIAL

## 2022-09-09 VITALS — BODY MASS INDEX: 25.58 KG/M2 | WEIGHT: 163 LBS | HEIGHT: 67 IN

## 2022-09-09 DIAGNOSIS — H54.62 VISION LOSS OF LEFT EYE: ICD-10-CM

## 2022-09-09 DIAGNOSIS — K21.9 GASTROESOPHAGEAL REFLUX DISEASE WITHOUT ESOPHAGITIS: ICD-10-CM

## 2022-09-09 DIAGNOSIS — Z00.00 PREVENTATIVE HEALTH CARE: ICD-10-CM

## 2022-09-09 PROBLEM — Z86.16 HISTORY OF COVID-19: Status: ACTIVE | Noted: 2020-11-28

## 2022-09-09 PROCEDURE — 99214 OFFICE O/P EST MOD 30 MIN: CPT | Mod: 95 | Performed by: PHYSICIAN ASSISTANT

## 2022-09-09 RX ORDER — OMEPRAZOLE 20 MG/1
CAPSULE, DELAYED RELEASE ORAL
Qty: 90 CAPSULE | Refills: 3 | Status: SHIPPED | OUTPATIENT
Start: 2022-09-09 | End: 2024-01-31 | Stop reason: SDUPTHER

## 2022-09-09 RX ORDER — UBIDECARENONE 75 MG
100 CAPSULE ORAL DAILY
COMMUNITY
End: 2024-01-31

## 2022-09-09 ASSESSMENT — PATIENT HEALTH QUESTIONNAIRE - PHQ9: CLINICAL INTERPRETATION OF PHQ2 SCORE: 0

## 2022-09-09 NOTE — PROGRESS NOTES
"Subjective:   Greg Mistry is a 37 y.o. male here today for left eye vision loss status post macular hole, GERD and preventative health care.    This evaluation was conducted via Zoom using secure and encrypted videoconferencing technology. The patient was in their home in the [facility].    The patient's identity was confirmed and verbal consent was obtained for this virtual visit.      Vision loss of left eye  This is a pleasant 37-year-old male here today to follow-up on a virtual visit regarding his health.  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  Was seen in the past by a retinal specialist.  Has vision loss to the left eye.  Would like a referral back to see the retinal specialist.    GERD (gastroesophageal reflux disease)  Chronic condition.  Takes omeprazole daily for reflux symptoms.  Would like a renewal.  No recent exacerbation if he is on medication.     Current medicines (including changes today)  Current Outpatient Medications   Medication Sig Dispense Refill    cyanocobalamin (VITAMIN B-12) 100 MCG Tab Take 100 mcg by mouth every day.      omeprazole (PRILOSEC) 20 MG delayed-release capsule TAKE 1 CAP BY MOUTH EVERY DAY. HALF AN HOUR PRIOR TO SAME MEAL. 90 Capsule 3     No current facility-administered medications for this visit.     He  has a past medical history of GERD (gastroesophageal reflux disease).    Social History and Family History were reviewed and updated.    ROS   No chest pain, no shortness of breath, no abdominal pain and all other systems were reviewed and are negative.       Objective:     Ht 1.702 m (5' 7\")   Wt 73.9 kg (163 lb)  Body mass index is 25.53 kg/m².   Physical Exam:  Constitutional: Alert, no distress.  Skin: Warm, dry, good turgor, no rashes in visible areas.  Eye: Equal, round and reactive, conjunctiva clear, lids normal.  ENMT: Lips without lesions, good dentition, oropharynx clear.  Neck: Trachea midline, no masses.   Lymph: No cervical or " supraclavicular lymphadenopathy  Respiratory: Unlabored respiratory effort, lungs appear clear.  Psych: Alert and oriented x3, normal affect and mood.        Assessment and Plan:   The following treatment plan was discussed    1. Vision loss of left eye  Chronic condition status post traumatic macular hole.  Resolved.  Still though with visual loss.  Referred to HD retina per patient's request.  - Referral to Ophthalmologic Vitreoretinal Surgery    2. Gastroesophageal reflux disease without esophagitis  Chronic condition.  Stable.  Renewed omeprazole as directed.  - omeprazole (PRILOSEC) 20 MG delayed-release capsule; TAKE 1 CAP BY MOUTH EVERY DAY. HALF AN HOUR PRIOR TO SAME MEAL.  Dispense: 90 Capsule; Refill: 3    3. [facility] care  Ordered labs.  Fast 8 hours.  He will be contacted with the results.  - CBC WITH DIFFERENTIAL; Future  - Comp Metabolic Panel; Future  - HEMOGLOBIN A1C; Future  - Lipid Profile; Future  - TSH WITH REFLEX TO FT4; Future         Followup: Return if symptoms worsen or fail to improve.    Please note that this dictation was created using voice recognition software. I have made every reasonable attempt to correct obvious errors, but I expect that there are errors of grammar and possibly content that I did not discover before finalizing the note.

## 2022-09-09 NOTE — ASSESSMENT & PLAN NOTE
This is a pleasant 37-year-old male here today to follow-up on a virtual visit regarding his health.  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  Was seen in the past by a retinal specialist.  Has vision loss to the left eye.  Would like a referral back to see the retinal specialist.

## 2022-09-09 NOTE — ASSESSMENT & PLAN NOTE
Chronic condition.  Takes omeprazole daily for reflux symptoms.  Would like a renewal.  No recent exacerbation if he is on medication.

## 2023-04-18 ENCOUNTER — HOSPITAL ENCOUNTER (OUTPATIENT)
Dept: LAB | Facility: MEDICAL CENTER | Age: 38
End: 2023-04-18
Attending: PHYSICIAN ASSISTANT
Payer: COMMERCIAL

## 2023-04-18 DIAGNOSIS — Z00.00 PREVENTATIVE HEALTH CARE: ICD-10-CM

## 2023-04-18 LAB
ALBUMIN SERPL BCP-MCNC: 4.7 G/DL (ref 3.2–4.9)
ALBUMIN/GLOB SERPL: 1.9 G/DL
ALP SERPL-CCNC: 57 U/L (ref 30–99)
ALT SERPL-CCNC: 16 U/L (ref 2–50)
ANION GAP SERPL CALC-SCNC: 11 MMOL/L (ref 7–16)
AST SERPL-CCNC: 17 U/L (ref 12–45)
BASOPHILS # BLD AUTO: 0.4 % (ref 0–1.8)
BASOPHILS # BLD: 0.03 K/UL (ref 0–0.12)
BILIRUB SERPL-MCNC: 0.9 MG/DL (ref 0.1–1.5)
BUN SERPL-MCNC: 13 MG/DL (ref 8–22)
CALCIUM ALBUM COR SERPL-MCNC: 8.7 MG/DL (ref 8.5–10.5)
CALCIUM SERPL-MCNC: 9.3 MG/DL (ref 8.5–10.5)
CHLORIDE SERPL-SCNC: 104 MMOL/L (ref 96–112)
CHOLEST SERPL-MCNC: 153 MG/DL (ref 100–199)
CO2 SERPL-SCNC: 26 MMOL/L (ref 20–33)
CREAT SERPL-MCNC: 1 MG/DL (ref 0.5–1.4)
EOSINOPHIL # BLD AUTO: 0.04 K/UL (ref 0–0.51)
EOSINOPHIL NFR BLD: 0.6 % (ref 0–6.9)
ERYTHROCYTE [DISTWIDTH] IN BLOOD BY AUTOMATED COUNT: 39.6 FL (ref 35.9–50)
EST. AVERAGE GLUCOSE BLD GHB EST-MCNC: 91 MG/DL
GFR SERPLBLD CREATININE-BSD FMLA CKD-EPI: 99 ML/MIN/1.73 M 2
GLOBULIN SER CALC-MCNC: 2.5 G/DL (ref 1.9–3.5)
GLUCOSE SERPL-MCNC: 90 MG/DL (ref 65–99)
HBA1C MFR BLD: 4.8 % (ref 4–5.6)
HCT VFR BLD AUTO: 39.9 % (ref 42–52)
HDLC SERPL-MCNC: 33 MG/DL
HGB BLD-MCNC: 14.2 G/DL (ref 14–18)
IMM GRANULOCYTES # BLD AUTO: 0.01 K/UL (ref 0–0.11)
IMM GRANULOCYTES NFR BLD AUTO: 0.1 % (ref 0–0.9)
LDLC SERPL CALC-MCNC: 106 MG/DL
LYMPHOCYTES # BLD AUTO: 1.8 K/UL (ref 1–4.8)
LYMPHOCYTES NFR BLD: 26.2 % (ref 22–41)
MCH RBC QN AUTO: 32.6 PG (ref 27–33)
MCHC RBC AUTO-ENTMCNC: 35.6 G/DL (ref 33.7–35.3)
MCV RBC AUTO: 91.5 FL (ref 81.4–97.8)
MONOCYTES # BLD AUTO: 0.53 K/UL (ref 0–0.85)
MONOCYTES NFR BLD AUTO: 7.7 % (ref 0–13.4)
NEUTROPHILS # BLD AUTO: 4.46 K/UL (ref 1.82–7.42)
NEUTROPHILS NFR BLD: 65 % (ref 44–72)
NRBC # BLD AUTO: 0 K/UL
NRBC BLD-RTO: 0 /100 WBC
PLATELET # BLD AUTO: 195 K/UL (ref 164–446)
PMV BLD AUTO: 10.2 FL (ref 9–12.9)
POTASSIUM SERPL-SCNC: 4.4 MMOL/L (ref 3.6–5.5)
PROT SERPL-MCNC: 7.2 G/DL (ref 6–8.2)
RBC # BLD AUTO: 4.36 M/UL (ref 4.7–6.1)
SODIUM SERPL-SCNC: 141 MMOL/L (ref 135–145)
TRIGL SERPL-MCNC: 69 MG/DL (ref 0–149)
TSH SERPL DL<=0.005 MIU/L-ACNC: 1.34 UIU/ML (ref 0.38–5.33)
WBC # BLD AUTO: 6.9 K/UL (ref 4.8–10.8)

## 2023-04-18 PROCEDURE — 84443 ASSAY THYROID STIM HORMONE: CPT

## 2023-04-18 PROCEDURE — 80061 LIPID PANEL: CPT

## 2023-04-18 PROCEDURE — 85025 COMPLETE CBC W/AUTO DIFF WBC: CPT

## 2023-04-18 PROCEDURE — 80053 COMPREHEN METABOLIC PANEL: CPT

## 2023-04-18 PROCEDURE — 83036 HEMOGLOBIN GLYCOSYLATED A1C: CPT

## 2023-04-18 PROCEDURE — 36415 COLL VENOUS BLD VENIPUNCTURE: CPT

## 2023-04-19 PROBLEM — Z86.16 HISTORY OF COVID-19: Status: RESOLVED | Noted: 2020-11-28 | Resolved: 2023-04-19

## 2023-09-11 ENCOUNTER — OFFICE VISIT (OUTPATIENT)
Dept: URGENT CARE | Facility: CLINIC | Age: 38
End: 2023-09-11
Payer: COMMERCIAL

## 2023-09-11 ENCOUNTER — HOSPITAL ENCOUNTER (OUTPATIENT)
Facility: MEDICAL CENTER | Age: 38
End: 2023-09-11
Attending: PHYSICIAN ASSISTANT
Payer: COMMERCIAL

## 2023-09-11 VITALS
OXYGEN SATURATION: 97 % | HEIGHT: 67 IN | RESPIRATION RATE: 20 BRPM | BODY MASS INDEX: 26.21 KG/M2 | SYSTOLIC BLOOD PRESSURE: 118 MMHG | HEART RATE: 98 BPM | DIASTOLIC BLOOD PRESSURE: 70 MMHG | TEMPERATURE: 98.7 F | WEIGHT: 167 LBS

## 2023-09-11 DIAGNOSIS — Z20.818 EXPOSURE TO STREPTOCOCCAL PHARYNGITIS: ICD-10-CM

## 2023-09-11 DIAGNOSIS — J02.9 SORE THROAT: ICD-10-CM

## 2023-09-11 LAB — S PYO DNA SPEC NAA+PROBE: NOT DETECTED

## 2023-09-11 PROCEDURE — 3078F DIAST BP <80 MM HG: CPT | Performed by: PHYSICIAN ASSISTANT

## 2023-09-11 PROCEDURE — 87070 CULTURE OTHR SPECIMN AEROBIC: CPT

## 2023-09-11 PROCEDURE — 99213 OFFICE O/P EST LOW 20 MIN: CPT | Performed by: PHYSICIAN ASSISTANT

## 2023-09-11 PROCEDURE — 3074F SYST BP LT 130 MM HG: CPT | Performed by: PHYSICIAN ASSISTANT

## 2023-09-11 PROCEDURE — 87651 STREP A DNA AMP PROBE: CPT | Performed by: PHYSICIAN ASSISTANT

## 2023-09-11 RX ORDER — IBUPROFEN 200 MG
600 TABLET ORAL EVERY 6 HOURS PRN
COMMUNITY
End: 2024-01-31

## 2023-09-11 RX ORDER — AMOXICILLIN 500 MG/1
500 CAPSULE ORAL 2 TIMES DAILY
Qty: 20 CAPSULE | Refills: 0 | Status: SHIPPED | OUTPATIENT
Start: 2023-09-11 | End: 2023-09-21

## 2023-09-11 ASSESSMENT — FIBROSIS 4 INDEX: FIB4 SCORE: 0.83

## 2023-09-11 NOTE — PROGRESS NOTES
"Subjective:   Greg Mistry is a 38 y.o. male who presents for Sore Throat (Body aches, nasal drainage, sore throat, fever, Strep exposure x 1 day)  This a very pleasant 30-year-old male who presents with sore throat, myalgias, postnasal drainage, fever.  His daughter was diagnosed with group B strep on Friday.  She projectile vomited on him over the weekend.  He denies cough or shortness of breath.  No COVID concerns.  He notes minimal pain with swallowing.  No measured fevers at home.  He is tolerating an oral diet without difficulty.      Medications:  cyanocobalamin Tabs  ibuprofen Tabs  omeprazole    Allergies:             Patient has no known allergies.    Surgical History:         Past Surgical History:   Procedure Laterality Date    OTHER ABDOMINAL SURGERY      appy       Past Social Hx:  Greg Mistry  reports that he has never smoked. He has never used smokeless tobacco. He reports that he does not currently use alcohol after a past usage of about 4.2 oz of alcohol per week. He reports that he does not use drugs.     Past Family Hx:   Greg Mistry family history is not on file.       Problem list, medications, and allergies reviewed by myself today in Epic.     Objective:     /70 (BP Location: Left arm, Patient Position: Sitting, BP Cuff Size: Adult)   Pulse 98   Temp 37.1 °C (98.7 °F) (Temporal)   Resp 20   Ht 1.702 m (5' 7\")   Wt 75.8 kg (167 lb)   SpO2 97%   BMI 26.16 kg/m²     Physical Exam  Vitals and nursing note reviewed.   Constitutional:       General: He is not in acute distress.     Appearance: Normal appearance. He is well-developed. He is not ill-appearing or diaphoretic.   HENT:      Head: Normocephalic.      Right Ear: Tympanic membrane normal.      Left Ear: Tympanic membrane normal.      Nose: Congestion and rhinorrhea present.      Mouth/Throat:      Palate: No mass.      Pharynx: Posterior oropharyngeal erythema present. No pharyngeal swelling, oropharyngeal exudate or uvula " swelling.      Tonsils: No tonsillar exudate or tonsillar abscesses. 1+ on the right. 1+ on the left.   Eyes:      Conjunctiva/sclera: Conjunctivae normal.      Pupils: Pupils are equal, round, and reactive to light.   Cardiovascular:      Rate and Rhythm: Normal rate and regular rhythm.      Pulses: Normal pulses.      Heart sounds: No murmur heard.  Pulmonary:      Effort: Pulmonary effort is normal. No tachypnea, accessory muscle usage or respiratory distress.      Breath sounds: Normal breath sounds. No stridor. No decreased breath sounds, wheezing, rhonchi or rales.      Comments: Lungs are clear to auscultation bilaterally without rhonchi rales or wheezes  Musculoskeletal:         General: Normal range of motion.      Cervical back: Normal range of motion and neck supple.   Skin:     General: Skin is warm and dry.   Neurological:      Mental Status: He is alert and oriented to person, place, and time.   Psychiatric:         Behavior: Behavior is cooperative.       Results for orders placed or performed in visit on 09/11/23   POCT GROUP A STREP, PCR   Result Value Ref Range    POC Group A Strep, PCR Not Detected Not Detected, Invalid         Assessment/Plan:     Diagnosis and Associated Orders:     1. Exposure to Streptococcal pharyngitis  - amoxicillin (AMOXIL) 500 MG Cap; Take 1 Capsule by mouth 2 times a day for 10 days.  Dispense: 20 Capsule; Refill: 0  - CULTURE THROAT; Future    2. Sore throat  - amoxicillin (AMOXIL) 500 MG Cap; Take 1 Capsule by mouth 2 times a day for 10 days.  Dispense: 20 Capsule; Refill: 0  - CULTURE THROAT; Future    Other orders  - ibuprofen (ADVIL) 200 MG Tab; Take 600 mg by mouth every 6 hours as needed.        Comments/MDM:  Pleasant 30-year-old male exposed to group B strep, his daughter whom he has been caring for.  Will treat empirically today and send throat culture and sensitivity.  We will be in touch patient via [email or web address] regarding results.  Patient will be able to stop  antibiotic if culture negative.  Warm salt water gargles.  Tylenol/ibuprofen as needed for pain.  Soft foods, cool liquids.  Throw away toothbrush in 2 to 3 days time to avoid reinfection.  Patient is contagious and placed on antibiotics for 24 hours.      I personally reviewed prior external notes and test results pertinent to today's visit. Supportive care, natural history, differential diagnoses, and indications for immediate follow-up discussed. Return to clinic or go to ED if symptoms worsen or persist.  Red flag symptoms discussed.  Patient/Parent/Guardian voices understanding. Follow-up with your primary care provider in 3-5 days.  All side effects of medication discussed including allergic response, GI upset, tendon injury, rash, sedation etc    Please note that this dictation was created using voice recognition software. I have made a reasonable attempt to correct obvious errors, but I expect that there are errors of grammar and possibly content that I did not discover before finalizing the note.    This note was electronically signed by Chela Sanders PA-C

## 2023-09-12 DIAGNOSIS — J02.9 SORE THROAT: ICD-10-CM

## 2023-09-12 DIAGNOSIS — Z20.818 EXPOSURE TO STREPTOCOCCAL PHARYNGITIS: ICD-10-CM

## 2023-09-14 LAB
BACTERIA SPEC RESP CULT: NORMAL
SIGNIFICANT IND 70042: NORMAL
SITE SITE: NORMAL
SOURCE SOURCE: NORMAL

## 2024-01-31 ENCOUNTER — OFFICE VISIT (OUTPATIENT)
Dept: MEDICAL GROUP | Facility: MEDICAL CENTER | Age: 39
End: 2024-01-31
Payer: COMMERCIAL

## 2024-01-31 VITALS
TEMPERATURE: 96.3 F | SYSTOLIC BLOOD PRESSURE: 102 MMHG | HEIGHT: 67 IN | DIASTOLIC BLOOD PRESSURE: 66 MMHG | OXYGEN SATURATION: 99 % | HEART RATE: 69 BPM | BODY MASS INDEX: 27.97 KG/M2 | WEIGHT: 178.2 LBS | RESPIRATION RATE: 16 BRPM

## 2024-01-31 DIAGNOSIS — R53.82 CHRONIC FATIGUE: ICD-10-CM

## 2024-01-31 DIAGNOSIS — K21.9 GASTROESOPHAGEAL REFLUX DISEASE WITHOUT ESOPHAGITIS: ICD-10-CM

## 2024-01-31 DIAGNOSIS — E78.00 ELEVATED LDL CHOLESTEROL LEVEL: ICD-10-CM

## 2024-01-31 DIAGNOSIS — Z00.00 ANNUAL PHYSICAL EXAM: ICD-10-CM

## 2024-01-31 PROBLEM — E53.8 LOW SERUM VITAMIN B12: Status: RESOLVED | Noted: 2018-10-22 | Resolved: 2024-01-31

## 2024-01-31 PROCEDURE — 3074F SYST BP LT 130 MM HG: CPT | Performed by: PHYSICIAN ASSISTANT

## 2024-01-31 PROCEDURE — 3078F DIAST BP <80 MM HG: CPT | Performed by: PHYSICIAN ASSISTANT

## 2024-01-31 PROCEDURE — 99395 PREV VISIT EST AGE 18-39: CPT | Performed by: PHYSICIAN ASSISTANT

## 2024-01-31 RX ORDER — OMEPRAZOLE 20 MG/1
CAPSULE, DELAYED RELEASE ORAL
Qty: 90 CAPSULE | Refills: 3 | Status: SHIPPED | OUTPATIENT
Start: 2024-01-31

## 2024-01-31 RX ORDER — OMEGA-3 FATTY ACIDS/FISH OIL 300-1000MG
1 CAPSULE ORAL
COMMUNITY

## 2024-01-31 ASSESSMENT — FIBROSIS 4 INDEX: FIB4 SCORE: 0.83

## 2024-01-31 ASSESSMENT — PATIENT HEALTH QUESTIONNAIRE - PHQ9: CLINICAL INTERPRETATION OF PHQ2 SCORE: 0

## 2024-02-01 NOTE — ASSESSMENT & PLAN NOTE
This is a pleasant 38-year-old male here today for an annual.  No new complaints today.  Would like his refill of omeprazole.  Would like to have it sent to [facility].  States that is very cheap there.  Does have a history of elevated LDL.  Would like some labs done pertaining to his risk for cardiovascular disease.  He has 3 daughters.  Wife asked him to back out of his vasectomy.

## 2024-02-01 NOTE — PROGRESS NOTES
"Subjective:   Greg Mistry is a 38 y.o. male here today for annual physical.    Annual physical exam  This is a pleasant 38-year-old male here today for an annual.  No new complaints today.  Would like his refill of omeprazole.  Would like to have it sent to [facility].  States that is very cheap there.  Does have a history of elevated LDL.  Would like some labs done pertaining to his risk for cardiovascular disease.  He has 3 daughters.  Wife asked him to back out of his vasectomy.       Current medicines (including changes today)  Current Outpatient Medications   Medication Sig Dispense Refill    omeprazole (PRILOSEC) 20 MG delayed-release capsule TAKE 1 CAP BY MOUTH EVERY DAY. HALF AN HOUR PRIOR TO SAME MEAL. 90 Capsule 3    Omega 3 1000 MG Cap Take 1 Capsule by mouth 2 times a day.       No current facility-administered medications for this visit.     He  has a past medical history of GERD (gastroesophageal reflux disease).    Social History and Family History were reviewed and updated.    ROS   No chest pain, no shortness of breath, no abdominal pain and all other systems were reviewed and are negative.       Objective:     /66 (BP Location: Left arm, Patient Position: Sitting, BP Cuff Size: Adult)   Pulse 69   Temp (!) 35.7 °C (96.3 °F) (Temporal)   Resp 16   Ht 1.702 m (5' 7\")   Wt 80.8 kg (178 lb 3.2 oz)   SpO2 99%  Body mass index is 27.91 kg/m².   Physical Exam:  Constitutional: Alert, no distress.  Skin: Warm, dry, good turgor, no rashes in visible areas.  Eye: Equal, round and reactive, conjunctiva clear, lids normal.  ENMT: Lips without lesions, good dentition, oropharynx clear.  Neck: Trachea midline, no masses.   Psych: Alert and oriented x3, normal affect and mood.        Assessment and Plan:   The following treatment plan was discussed    1. Annual physical exam  Generally healthy male.  Ordered labs.  Fast 8 hours.  Perform between 7 and 9 AM.  - CBC WITH DIFFERENTIAL; Future  - " Comp Metabolic Panel; Future  - HEMOGLOBIN A1C; Future  - Lipid Profile; Future  - Testosterone, Free & Total, Adult Male (w/SHBG); Future    2. Gastroesophageal reflux disease without esophagitis  Chronic condition.  Stable.  Renewed omeprazole.  Sent to mail order pharmacy.  - omeprazole (PRILOSEC) 20 MG delayed-release capsule; TAKE 1 CAP BY MOUTH EVERY DAY. HALF AN HOUR PRIOR TO SAME MEAL.  Dispense: 90 Capsule; Refill: 3    3. Elevated LDL cholesterol level  Chronic condition.  Status unknown.  Order labs.  Fast 8 hours.  - Lipid Profile; Future  - Lipoprotein (a); Future  - APOLIPOPROTEIN B; Future    4. Chronic fatigue  Chronic condition.  Unknown cause.  Ordered testosterone level.  Perform between 7 and 9 AM.  - Testosterone, Free & Total, Adult Male (w/SHBG); Future         Followup: Return in about 1 year (around 1/31/2025), or if symptoms worsen or fail to improve.    Please note that this dictation was created using voice recognition software. I have made every reasonable attempt to correct obvious errors, but I expect that there are errors of grammar and possibly content that I did not discover before finalizing the note.

## 2024-02-14 ENCOUNTER — OFFICE VISIT (OUTPATIENT)
Dept: MEDICAL GROUP | Facility: MEDICAL CENTER | Age: 39
End: 2024-02-14
Payer: COMMERCIAL

## 2024-02-14 VITALS
DIASTOLIC BLOOD PRESSURE: 56 MMHG | HEIGHT: 67 IN | BODY MASS INDEX: 28.25 KG/M2 | HEART RATE: 81 BPM | SYSTOLIC BLOOD PRESSURE: 94 MMHG | TEMPERATURE: 97.2 F | RESPIRATION RATE: 16 BRPM | WEIGHT: 180 LBS

## 2024-02-14 DIAGNOSIS — J02.9 ACUTE PHARYNGITIS, UNSPECIFIED ETIOLOGY: ICD-10-CM

## 2024-02-14 LAB — S PYO DNA SPEC NAA+PROBE: NOT DETECTED

## 2024-02-14 PROCEDURE — 99213 OFFICE O/P EST LOW 20 MIN: CPT | Performed by: PHYSICIAN ASSISTANT

## 2024-02-14 PROCEDURE — 87651 STREP A DNA AMP PROBE: CPT | Performed by: PHYSICIAN ASSISTANT

## 2024-02-14 PROCEDURE — 3078F DIAST BP <80 MM HG: CPT | Performed by: PHYSICIAN ASSISTANT

## 2024-02-14 PROCEDURE — 3074F SYST BP LT 130 MM HG: CPT | Performed by: PHYSICIAN ASSISTANT

## 2024-02-14 ASSESSMENT — FIBROSIS 4 INDEX: FIB4 SCORE: 0.83

## 2024-02-16 NOTE — PROGRESS NOTES
"Subjective:   Greg Mistry is a 38 y.o. male here today for sore throat for 3 days.  Close contacts with strep.    Acute pharyngitis  This is a pleasant 38-year-old male here today complaining of a 3-day history of a sore throat.  Associated body aches and some nasal congestion.  He has 3 children who are sick at home with strep.  He is interested in being tested for strep.       Current medicines (including changes today)  Current Outpatient Medications   Medication Sig Dispense Refill    omeprazole (PRILOSEC) 20 MG delayed-release capsule TAKE 1 CAP BY MOUTH EVERY DAY. HALF AN HOUR PRIOR TO SAME MEAL. 90 Capsule 3    Omega 3 1000 MG Cap Take 1 Capsule by mouth 2 times a day.       No current facility-administered medications for this visit.     He  has a past medical history of GERD (gastroesophageal reflux disease).    Social History and Family History were reviewed and updated.    ROS   No chest pain, no shortness of breath, no abdominal pain and all other systems were reviewed and are negative.       Objective:     BP 94/56 (BP Location: Right arm, Patient Position: Sitting, BP Cuff Size: Adult)   Pulse 81   Temp 36.2 °C (97.2 °F) (Temporal)   Resp 16   Ht 1.702 m (5' 7\")   Wt 81.6 kg (180 lb)  Body mass index is 28.19 kg/m².   Physical Exam:  Constitutional: Alert, no distress.  Skin: Warm, dry, good turgor, no rashes in visible areas.  Eye: Equal, round and reactive, conjunctiva clear, lids normal.  ENMT: Lips without lesions, good dentition, oropharynx clear.  Neck: Trachea midline, no masses.   Psych: Alert and oriented x3, normal affect and mood.        Assessment and Plan:   The following treatment plan was discussed    1. Acute pharyngitis, unspecified etiology  Acute, new onset condition.  Rapid strep testing was negative.  Likely viral process.  Symptoms may continue for up to 1 more week or longer.  May develop worsening symptoms such as sinus congestion with worsening drainage causing significant " coughing.  May take ibuprofen as directed for the sore throat.  May take over-the-counter cold medications if needed for nasal congestion and drainage.  Follow-up with any worsening concerns.  - POCT Cepheid Group A Strep - PCR         Followup: Return if symptoms worsen or fail to improve.    Please note that this dictation was created using voice recognition software. I have made every reasonable attempt to correct obvious errors, but I expect that there are errors of grammar and possibly content that I did not discover before finalizing the note.

## 2024-02-16 NOTE — ASSESSMENT & PLAN NOTE
This is a pleasant 38-year-old male here today complaining of a 3-day history of a sore throat.  Associated body aches and some nasal congestion.  He has 3 children who are sick at home with strep.  He is interested in being tested for strep.

## 2024-04-12 ENCOUNTER — OFFICE VISIT (OUTPATIENT)
Dept: URGENT CARE | Facility: CLINIC | Age: 39
End: 2024-04-12
Payer: COMMERCIAL

## 2024-04-12 VITALS
WEIGHT: 178.6 LBS | TEMPERATURE: 97 F | RESPIRATION RATE: 16 BRPM | DIASTOLIC BLOOD PRESSURE: 70 MMHG | BODY MASS INDEX: 28.03 KG/M2 | OXYGEN SATURATION: 97 % | HEART RATE: 82 BPM | HEIGHT: 67 IN | SYSTOLIC BLOOD PRESSURE: 118 MMHG

## 2024-04-12 DIAGNOSIS — R42 INTERMITTENT LIGHTHEADEDNESS: ICD-10-CM

## 2024-04-12 DIAGNOSIS — R07.89 CHEST DISCOMFORT: ICD-10-CM

## 2024-04-12 LAB — GLUCOSE BLD-MCNC: 95 MG/DL (ref 65–99)

## 2024-04-12 PROCEDURE — 99214 OFFICE O/P EST MOD 30 MIN: CPT

## 2024-04-12 PROCEDURE — 82962 GLUCOSE BLOOD TEST: CPT

## 2024-04-12 PROCEDURE — 3074F SYST BP LT 130 MM HG: CPT

## 2024-04-12 PROCEDURE — 3078F DIAST BP <80 MM HG: CPT

## 2024-04-12 ASSESSMENT — ENCOUNTER SYMPTOMS
DIZZINESS: 1
SENSORY CHANGE: 0
WEAKNESS: 0
SPEECH CHANGE: 0
CHILLS: 0
FEVER: 0
SHORTNESS OF BREATH: 0
CLAUDICATION: 0
FOCAL WEAKNESS: 0
SEIZURES: 0
TREMORS: 0
TINGLING: 0
HEADACHES: 1
LOSS OF CONSCIOUSNESS: 0
ORTHOPNEA: 0
PALPITATIONS: 0
PND: 0

## 2024-04-12 ASSESSMENT — FIBROSIS 4 INDEX: FIB4 SCORE: 0.83

## 2024-04-12 NOTE — PROGRESS NOTES
"Subjective:   Greg Mistry is a very pleasant 38 y.o. male who presents for:    Chief Complaint   Patient presents with    Lightheadedness     Concerned about blood pressure, lightheaded and generally feeling off. He mentioned that it started today at lunch.       HPI:    The patient presents to the urgent care for evaluation of lightheadedness and feeling that he was going to \"pass out.\"  He is concerned that is having elevated blood pressure, which may be contributing to his symptoms.  He reports soreness in his left shoulder that radiates up the lateral aspect of left side of the neck. Slight LS chest pressure below the pectoral musculature. Denies diaphoresis, but he endorses feeling \"hot.\" Woke up this morning with a moderate LS headache and has experienced a couple episodes of his \"eyes darting\". He took ibuprofen, which \"took the edge off.\" Reports that he has engaged in exercises over the past 24 hours that may be contributing to his chest, arm, and neck pain. Denies neurological changes, syncopal episodes, changes in balance, judgement, coordination, orthopnea, claudication, leg swelling, PND, or palpitations, dyspnea. PMH of elevated LDL, which he is attempting to lower with lifestyle modifications. Denies history of hypertension, migraine headaches, chronic illness. No new medications, supplements.  He has GERD, which is well-managed with the use of PPI therapy.  Reports that he had similar symptoms about 10 to 15 years ago, for which he saw a neurologist and had a significant workup completed, including MRI.  He was ultimately diagnosed with allergies and reports that the symptoms he is having are similar to those he has had in the past.      ROS:    Review of Systems   Constitutional:  Negative for chills, fever and malaise/fatigue.   Eyes:         \"Darting vision\"   Respiratory:  Negative for shortness of breath.    Cardiovascular:  Negative for palpitations, orthopnea, claudication, leg swelling " and PND.        LS chest wall discomfort   Neurological:  Positive for dizziness and headaches. Negative for tingling, tremors, sensory change, speech change, focal weakness, seizures, loss of consciousness and weakness.   All other systems reviewed and are negative.      Medications:      Current Outpatient Medications   Medication Sig    omeprazole (PRILOSEC) 20 MG delayed-release capsule TAKE 1 CAP BY MOUTH EVERY DAY. HALF AN HOUR PRIOR TO SAME MEAL.    Omega 3 1000 MG Cap Take 1 Capsule by mouth 2 times a day.       Allergies:     No Known Allergies    Problem List:     Patient Active Problem List   Diagnosis    GERD (gastroesophageal reflux disease)    Acute pharyngitis    Vision loss of left eye    Annual physical exam    Elevated LDL cholesterol level       Surgical History:    Past Surgical History:   Procedure Laterality Date    OTHER ABDOMINAL SURGERY      appy       Past Social Hx:     Social History     Socioeconomic History    Marital status:    Tobacco Use    Smoking status: Never    Smokeless tobacco: Never   Vaping Use    Vaping Use: Never used   Substance and Sexual Activity    Alcohol use: Yes     Alcohol/week: 2.4 oz     Types: 2 Glasses of wine, 2 Cans of beer per week    Drug use: No    Sexual activity: Yes     Partners: Female     Comment: , three children (3,5,6)        Past Family Hx:      History reviewed. No pertinent family history.    Problem list, medications, and allergies reviewed by myself today in Epic.     Objective:     Vitals:    04/12/24 1448   BP: 118/70   Pulse: 82   Resp: 16   Temp: 36.1 °C (97 °F)   SpO2: 97%       Physical Exam  Vitals reviewed.   Constitutional:       General: He is not in acute distress.     Appearance: Normal appearance. He is normal weight. He is not ill-appearing, toxic-appearing or diaphoretic.   HENT:      Head: Normocephalic and atraumatic.      Right Ear: Tympanic membrane, ear canal and external ear normal.      Left Ear: Tympanic  membrane, ear canal and external ear normal.      Nose: Nose normal.      Mouth/Throat:      Mouth: Mucous membranes are moist.      Pharynx: Oropharynx is clear.   Eyes:      Extraocular Movements: Extraocular movements intact.      Conjunctiva/sclera: Conjunctivae normal.      Pupils: Pupils are equal, round, and reactive to light.   Cardiovascular:      Rate and Rhythm: Normal rate and regular rhythm.      Chest Wall: PMI is not displaced. No thrill.      Pulses: Normal pulses. No decreased pulses.      Heart sounds: Normal heart sounds. Heart sounds not distant. No murmur heard.     No systolic murmur is present.      No diastolic murmur is present.      No friction rub. No gallop. No S3 or S4 sounds.   Pulmonary:      Effort: Pulmonary effort is normal.      Breath sounds: Normal breath sounds.   Abdominal:      General: Abdomen is flat. Bowel sounds are normal.      Palpations: Abdomen is soft.   Musculoskeletal:         General: Normal range of motion.        Arms:       Cervical back: Normal range of motion and neck supple.      Right lower leg: No edema.      Left lower leg: No edema.      Comments: Mild tenderness to palpation distal to the left pectoral musculature, lateral aspect of the deltoid, and lateral aspect of the neck.  No overlying skin changes, gross deformity, decrease in range of motion.   Skin:     General: Skin is warm and dry.   Neurological:      General: No focal deficit present.      Mental Status: He is alert and oriented to person, place, and time. Mental status is at baseline.   Psychiatric:         Mood and Affect: Mood normal.         Behavior: Behavior normal.         Thought Content: Thought content normal.         Judgment: Judgment normal.     EKG Interpretation   Interpreted by me   Rhythm: Normal sinus.  Probable early repolarization.  Rate: normal   Axis: normal   Ectopy: none   Conduction: normal   ST Segments: no acute change   T Waves: no acute change   Q Waves: none    Clinical Impression: no acute changes and normal EKG             Results from POCT:        Results for orders placed or performed in visit on 04/12/24   POCT Glucose   Result Value Ref Range    Glucose - Accu-Ck 95 65 - 99 mg/dL        Assessment/Plan:     Diagnosis and associated orders:     1. Chest discomfort  - EKG - Clinic Performed    2. Intermittent lightheadedness  - POCT Glucose          Comments/MDM:     The patient presents to the urgent care for the evaluation of intermittent lightheadedness, overall malaise, and slight left-sided chest discomfort that radiates to the top of the left shoulder and the left lateral aspect of the neck.    On physical exam, the pain is reproducible with mild palpation to the area.    He is normotensive in clinic.  EKG demonstrates no evidence of ST elevation, ischemia, hypertrophy, arrhythmias.  There is a early repolarization present.    POCT glucose was 95.  The patient reports that he is not feeling as lightheaded as he was before based on the patient's nonspecific symptoms of dizziness and  Chest pain that is reproducible with palpation, the differential diagnosis is broad.  The patient was made aware that there are strict ER precautions in the event that he develops symptoms consistent with AMI, including worsening chest pain, claudication, diaphoresis, pain radiating to the jaw/left arm, worsening fatigue or malaise, worsening lightheadedness, shortness of breath. The patient was advised that all concerning causes of chest pain cannot be ruled out in a single urgent care visit.  Labs were offered, but patient declined as he reports that he will be having lab work completed that was previously ordered by his PCP         All questions answered. Patient verbalized understanding and is in agreement with this plan of care.     If symptoms are worsening or not improving in 3-5 days, follow-up with PCP or return to . Differential diagnosis, natural history, and supportive  care discussed. AVS handout given and reviewed with patient. Patient educated on red flags and when to seek treatment back in ED or UC.     I personally reviewed prior external notes and test results pertinent to today's visit.  I have independently reviewed and interpreted all diagnostics ordered during this urgent care visit.     This dictation has been created using voice recognition software. The accuracy of the dictation is limited by the abilities of the software. I expect there may be some errors of grammar and possibly content. I made every attempt to manually correct the errors within my dictation. However, errors related to voice recognition software may still exist and should be interpreted within the appropriate context.    This note was electronically signed by GALDINO Swan

## 2024-04-15 ENCOUNTER — HOSPITAL ENCOUNTER (OUTPATIENT)
Dept: LAB | Facility: MEDICAL CENTER | Age: 39
End: 2024-04-15
Attending: PHYSICIAN ASSISTANT
Payer: COMMERCIAL

## 2024-04-15 DIAGNOSIS — E78.00 ELEVATED LDL CHOLESTEROL LEVEL: ICD-10-CM

## 2024-04-15 DIAGNOSIS — Z00.00 ANNUAL PHYSICAL EXAM: ICD-10-CM

## 2024-04-15 DIAGNOSIS — R53.82 CHRONIC FATIGUE: ICD-10-CM

## 2024-04-15 LAB
ALBUMIN SERPL BCP-MCNC: 4.9 G/DL (ref 3.2–4.9)
ALBUMIN/GLOB SERPL: 2.5 G/DL
ALP SERPL-CCNC: 55 U/L (ref 30–99)
ALT SERPL-CCNC: 16 U/L (ref 2–50)
ANION GAP SERPL CALC-SCNC: 12 MMOL/L (ref 7–16)
AST SERPL-CCNC: 19 U/L (ref 12–45)
BASOPHILS # BLD AUTO: 0.8 % (ref 0–1.8)
BASOPHILS # BLD: 0.04 K/UL (ref 0–0.12)
BILIRUB SERPL-MCNC: 1.1 MG/DL (ref 0.1–1.5)
BUN SERPL-MCNC: 14 MG/DL (ref 8–22)
CALCIUM ALBUM COR SERPL-MCNC: 8.5 MG/DL (ref 8.5–10.5)
CALCIUM SERPL-MCNC: 9.2 MG/DL (ref 8.5–10.5)
CHLORIDE SERPL-SCNC: 105 MMOL/L (ref 96–112)
CHOLEST SERPL-MCNC: 211 MG/DL (ref 100–199)
CO2 SERPL-SCNC: 25 MMOL/L (ref 20–33)
CREAT SERPL-MCNC: 0.89 MG/DL (ref 0.5–1.4)
EOSINOPHIL # BLD AUTO: 0.12 K/UL (ref 0–0.51)
EOSINOPHIL NFR BLD: 2.5 % (ref 0–6.9)
ERYTHROCYTE [DISTWIDTH] IN BLOOD BY AUTOMATED COUNT: 38 FL (ref 35.9–50)
EST. AVERAGE GLUCOSE BLD GHB EST-MCNC: 94 MG/DL
GFR SERPLBLD CREATININE-BSD FMLA CKD-EPI: 112 ML/MIN/1.73 M 2
GLOBULIN SER CALC-MCNC: 2 G/DL (ref 1.9–3.5)
GLUCOSE SERPL-MCNC: 99 MG/DL (ref 65–99)
HBA1C MFR BLD: 4.9 % (ref 4–5.6)
HCT VFR BLD AUTO: 41.2 % (ref 42–52)
HDLC SERPL-MCNC: 41 MG/DL
HGB BLD-MCNC: 14.8 G/DL (ref 14–18)
IMM GRANULOCYTES # BLD AUTO: 0.01 K/UL (ref 0–0.11)
IMM GRANULOCYTES NFR BLD AUTO: 0.2 % (ref 0–0.9)
LDLC SERPL CALC-MCNC: 153 MG/DL
LYMPHOCYTES # BLD AUTO: 1.57 K/UL (ref 1–4.8)
LYMPHOCYTES NFR BLD: 32.8 % (ref 22–41)
MCH RBC QN AUTO: 32.4 PG (ref 27–33)
MCHC RBC AUTO-ENTMCNC: 35.9 G/DL (ref 32.3–36.5)
MCV RBC AUTO: 90.2 FL (ref 81.4–97.8)
MONOCYTES # BLD AUTO: 0.3 K/UL (ref 0–0.85)
MONOCYTES NFR BLD AUTO: 6.3 % (ref 0–13.4)
NEUTROPHILS # BLD AUTO: 2.75 K/UL (ref 1.82–7.42)
NEUTROPHILS NFR BLD: 57.4 % (ref 44–72)
NRBC # BLD AUTO: 0 K/UL
NRBC BLD-RTO: 0 /100 WBC (ref 0–0.2)
PLATELET # BLD AUTO: 175 K/UL (ref 164–446)
PMV BLD AUTO: 11 FL (ref 9–12.9)
POTASSIUM SERPL-SCNC: 4.1 MMOL/L (ref 3.6–5.5)
PROT SERPL-MCNC: 6.9 G/DL (ref 6–8.2)
RBC # BLD AUTO: 4.57 M/UL (ref 4.7–6.1)
SODIUM SERPL-SCNC: 142 MMOL/L (ref 135–145)
TRIGL SERPL-MCNC: 87 MG/DL (ref 0–149)
WBC # BLD AUTO: 4.8 K/UL (ref 4.8–10.8)

## 2024-04-15 PROCEDURE — 83036 HEMOGLOBIN GLYCOSYLATED A1C: CPT

## 2024-04-15 PROCEDURE — 36415 COLL VENOUS BLD VENIPUNCTURE: CPT

## 2024-04-15 PROCEDURE — 82172 ASSAY OF APOLIPOPROTEIN: CPT

## 2024-04-15 PROCEDURE — 80053 COMPREHEN METABOLIC PANEL: CPT

## 2024-04-15 PROCEDURE — 84403 ASSAY OF TOTAL TESTOSTERONE: CPT

## 2024-04-15 PROCEDURE — 80061 LIPID PANEL: CPT

## 2024-04-15 PROCEDURE — 83695 ASSAY OF LIPOPROTEIN(A): CPT

## 2024-04-15 PROCEDURE — 84402 ASSAY OF FREE TESTOSTERONE: CPT

## 2024-04-15 PROCEDURE — 85025 COMPLETE CBC W/AUTO DIFF WBC: CPT

## 2024-04-15 PROCEDURE — 84270 ASSAY OF SEX HORMONE GLOBUL: CPT

## 2024-04-16 LAB
LPA SERPL-MCNC: 15 MG/DL
SHBG SERPL-SCNC: 34 NMOL/L (ref 17–56)
TESTOST FREE MFR SERPL: 1.9 % (ref 1.6–2.9)
TESTOST FREE SERPL-MCNC: 95 PG/ML (ref 47–244)
TESTOST SERPL-MCNC: 507 NG/DL (ref 300–1080)

## 2024-04-17 LAB — APO B100 SERPL-MCNC: 114 MG/DL (ref 66–133)

## 2025-08-16 ENCOUNTER — OFFICE VISIT (OUTPATIENT)
Dept: URGENT CARE | Facility: CLINIC | Age: 40
End: 2025-08-16
Payer: COMMERCIAL

## 2025-08-16 VITALS
DIASTOLIC BLOOD PRESSURE: 58 MMHG | HEART RATE: 68 BPM | RESPIRATION RATE: 14 BRPM | WEIGHT: 180 LBS | SYSTOLIC BLOOD PRESSURE: 98 MMHG | OXYGEN SATURATION: 95 % | TEMPERATURE: 98 F | BODY MASS INDEX: 28.25 KG/M2 | HEIGHT: 67 IN

## 2025-08-16 DIAGNOSIS — H60.391 ACUTE INFECTION OF RIGHT EXTERNAL EAR: Primary | ICD-10-CM

## 2025-08-16 DIAGNOSIS — R59.0 PREAURICULAR ADENOPATHY: ICD-10-CM

## 2025-08-16 PROCEDURE — 99214 OFFICE O/P EST MOD 30 MIN: CPT | Performed by: NURSE PRACTITIONER

## 2025-08-16 PROCEDURE — 3074F SYST BP LT 130 MM HG: CPT | Performed by: NURSE PRACTITIONER

## 2025-08-16 PROCEDURE — 3078F DIAST BP <80 MM HG: CPT | Performed by: NURSE PRACTITIONER

## 2025-08-16 ASSESSMENT — FIBROSIS 4 INDEX: FIB4 SCORE: 1.085714285714285714
